# Patient Record
(demographics unavailable — no encounter records)

---

## 2024-10-16 NOTE — PROCEDURE
[FreeTextEntry1] : 10/2024 normal spirometry, stable previous data reviewed: 4/2024- normal spirometry   9/2023- normal spirometry, mild reduced RV, redued dlco that does not correct- stable compared to 2/2023  normal flow rates, mild decline from 2022 normal TLC reduced dlco- moderate that corrects to [facility]  [facility] 2021- resolved PETTY PNA

## 2024-10-16 NOTE — HISTORY OF PRESENT ILLNESS
[Never] : never [TextBox_4] : JONAS HINKLE is a 31 year old female who presents for f/u pfts  and pulm f/u she has lupus on plaquenil, cellcept & off prednisone  on weight loss meds  here for routine visit  off any bronchodilators    Smoking Status: never

## 2024-11-18 NOTE — HISTORY OF PRESENT ILLNESS
[FreeTextEntry1] : pt presents for follow up tvs with anterior wall new fibroid vs adenomyosis and right ovarian endometrioma decreased in size since last tvs on ocp denies pain or dub

## 2025-01-30 NOTE — HISTORY OF PRESENT ILLNESS
[FreeTextEntry1] : 2019.  Patient is a 27-year-old heavy woman who 10 years ago had symptoms consistent with pericarditis and was diagnosed with lupus at [facility].  At that time she did have fluid around her heart and she has been on CellCept, Plaquenil, and omeprazole since then.  Last  she had severe onset of chest pain and shortness of breath.  For a day or 2 took aspirin and Naprosyn and then on Wednesday finally went to the hospital.  There she had a troponin that was elevated at 83, isha to 100 and came down to 84, and a d-dimer of 623, sed rate of 84 and CRP of 12.62.  She claims her sed rate and CRP are usually normal.  She had a negative chest x-ray.  No ECG changes.  CT angiogram showed a small left pleural effusion but no pericardial effusion.  There was no central pulmonary emboli but they emphasize it was a limited study there was a suspicion for pulmonary emboli a VQ scan should be done.  Echocardiogram was done but was poor quality.  There was grossly normal LV function in the RV and RA were not well seen.  No pericardial effusion and normal pericardium.  She was placed on prednisone 60 mg and colchicine initially 1.2 mg and then 0.6 mg twice daily.  She was seen by cardiology who felt they could stop the colchicine.  She was not seen by rheumatology and she was discharged on  with follow-up here.  She shows up now and is still been having chest pain at rest and still having shortness of breath with some pleuritic component.  No long car trips or airplane rides etc.  No pain or swelling in her calves or thighs.She did have a negative venous Doppler earlier in the year when she had mild swelling.  She was briefly on furosemide but when the swelling went away the furosemide was stopped. Here there was no rub on exam no ECG changes and I elected to have her go for a VQ scan to fully rule out pulmonary emboli before assuming this was myopericarditis. 2019.  After low probability VQ scan with small fixed defect, radiology called me back today that the small infiltrate on the left lung in that area it looks bigger than it did in the hospital raising suspicion of possible pulmonary infarction. It is controversial if someone has just a small subsegmental luminary infarct that they need anticoagulation. However, based on that I am putting the patient back on Xarelto 20 mg for now and having her come back next week with repeat echo and visit to reevaluate the picture.  2019.  Patient returns in follow-up.  Had echocardiogram yesterday at [facility] and this time there is a new finding of a small to moderate lateral pericardial effusion, lateral to the LV.  No echocardiographic evidence of tamponade.  LVEF 60%.  RVSP 29.  EKG shows sinus rhythm at 76 and again is a normal tracing with no findings for acute pericarditis.  Feels better overall with no pleurisy or positional chest pain just a little point tenderness by the PMI. 2019.  Patient here in follow-up and for echocardiogram.  Was tapering off steroids and on Monday the  chest pain was so severe she went to the emergency room.  Work-up there was negative and she was bumped up to 40 mg of prednisone and told to keep her appointment here.  Here today her echocardiogram is within normal limits showing no pericardial effusion normal LV and RV function etc.  She is feeling better but still has some symptoms with inspiration.  Still has colchicine for 1 more week. 2019.  Patient was told at the last visit to come back in 6 weeks.  Here now.  Had labs  and  and .  Remains mildly anemic.  Cholesterol was 208 with an HDL of 71 and an LDL of 95 triglycerides 211.  Sed rate was 82 with an BEATRICE of 1:180 speckled pattern.  Positive ORACIO Salinas antibody and ORACIO RNP antibody.  Some degree of proteinuria.  Her EKG shows sinus rhythm at 85 and was a normal tracing.  She has gained over 20 pounds and has significant edema which I believe is new.  Her blood pressure had not been a problem before but she claims she went to the renal doctor and her blood pressure was high and he gave her amlodipine 10 mg.  Since then she has been retaining fluid.  She has also been on prednisone 10 mg daily.  She was given Lasix 80 mg for 1 week and now is on 40 mg but she does not think it has been much change.  Her next appointment with renal (Dr. Sima garcia [phone]) is at the end of December. May 20, 2021.  First visit in a year and a half.  She has a sharp chest pain upper sternum when she runs up the stairs that comes on after she stops and last for about 5 minutes.  It has been there for few weeks but has not gotten worse.  No chest pain walking inclines carrying packages etc. or waking her up from sleep.  Not really pleuritic but it is a little tender to touch.  Sent here for echo as well which was suboptimal but mostly within normal limits.  No pericardial effusion.  She had nifedipine added for blood pressure about 6 weeks ago.  She remains on enalapril, atorvastatin, Metformin along with her prednisone and her plaque in L and her CellCept.  Had a chest CT with pulmonary that had no pleural effusion or pericardial effusion and prior infiltrates just about gone.  No coronary calcification and normal heart size.  Her EKG is sinus rhythm at 79 and unremarkable.  2023.  First visit in over 2 years.  About 3 weeks ago she was at the hospital with her father who had a liver and kidney transplant and went into pulmonary edema and  in front of her.  She started having a lot of palpitations but they have not resolved even though she thinks she is not as stressed.  They are definitely less frequent but they come every few days last for a few minutes and seem more continuous than intermittent.  She does not get syncopal or near syncopal but she feels short of breath doing them.  She has been doing fairly well since I last saw her and is about to be put on medications by her endocrinologist for weight loss.  She is off the metformin and she is off any inhalers.  Has not had cardiac issues since the original episode.  Her EKG today is sinus rhythm at 98 and an unremarkable ECG as well as unchanged from previous.  Her echo back in  was pretty unremarkable. September 15, 2023. Reviewed results of the patient's Zio monitor and spoke with her. Patient triggered the monitor a couple of times but no severe symptoms. No arrhythmias noted common and occasional VPC and nothing that correlated with any of her symptoms. Most likely it is all just stress related and the patient was reassured.  May 22, 2024.Patient here for first time in about 9 months or so.  Her EKG is sinus rhythm at 89 and unremarkable.  Blood pressure okay and weight stable.  She has been very stable on her current regimen without any recent hospitalizations, emergency room visits, cardiac complaints.  Recent visit her blood test numbers for her lupus were much higher so they told her to get a cardiac evaluation.  She denies chest pain pleurisy shortness of breath. 2025.  Patient returns in follow-up.  Her lupus has flared up and her sed rate is up to 120 and she is having some chest pain so they wanted her heart checked out.  EKG is sinus rhythm 100 with some nonspecific changes, but unchanged from prior EKG blood pressure.  She says the chest pain comes at rest can last up to an hour, not affected by inspiration or position or exertion.  They have increased her meds a little less than a week ago but she has not seen a change yet in the symptoms.

## 2025-01-30 NOTE — REASON FOR VISIT
[FreeTextEntry1] : 32 year-old woman with lupus, with the prior diagnosis of lupus myopericarditis.

## 2025-01-30 NOTE — PHYSICAL EXAM
[Normal Appearance] : normal appearance [General Appearance - Well Developed] : well developed [Well Groomed] : well groomed [General Appearance - Well Nourished] : well nourished [No Deformities] : no deformities [General Appearance - In No Acute Distress] : no acute distress [Normal Conjunctiva] : the conjunctiva exhibited no abnormalities [Eyelids - No Xanthelasma] : the eyelids demonstrated no xanthelasmas [Normal Oral Mucosa] : normal oral mucosa [No Oral Pallor] : no oral pallor [No Oral Cyanosis] : no oral cyanosis [Normal Jugular Venous A Waves Present] : normal jugular venous A waves present [Normal Jugular Venous V Waves Present] : normal jugular venous V waves present [No Jugular Venous Fuchs A Waves] : no jugular venous fuchs A waves [Respiration, Rhythm And Depth] : normal respiratory rhythm and effort [Exaggerated Use Of Accessory Muscles For Inspiration] : no accessory muscle use [Heart Rate And Rhythm] : heart rate and rhythm were normal [Heart Sounds] : normal S1 and S2 [Murmurs] : no murmurs present [Abdomen Soft] : soft [Abdomen Tenderness] : non-tender [Abdomen Mass (___ Cm)] : no abdominal mass palpated [Nail Clubbing] : no clubbing of the fingernails [Cyanosis, Localized] : no localized cyanosis [Petechial Hemorrhages (___cm)] : no petechial hemorrhages [Skin Color & Pigmentation] : normal skin color and pigmentation [] : no rash [No Venous Stasis] : no venous stasis [Skin Lesions] : no skin lesions [No Skin Ulcers] : no skin ulcer [No Xanthoma] : no  xanthoma was observed [Oriented To Time, Place, And Person] : oriented to person, place, and time [Affect] : the affect was normal [Mood] : the mood was normal [No Anxiety] : not feeling anxious [FreeTextEntry1] : Calm not anxious

## 2025-01-30 NOTE — DISCUSSION/SUMMARY
[FreeTextEntry1] : Labs sent clued and proBNP.  If abnormal we will bring her back for an echo; otherwise reassure her that chest pain is likely noncardiac in this case. [EKG obtained to assist in diagnosis and management of assessed problem(s)] : EKG obtained to assist in diagnosis and management of assessed problem(s)

## 2025-01-30 NOTE — ASSESSMENT
[FreeTextEntry1] : Patient with previous episode of lupus pericarditis with pericardial effusion etc. All resolved. Has been stable from a cardiac point of view and I have not seen her for a year and a half.. Patient returned 2021 with upper sternal chest pain that comes on after she runs up the stairs and last for about 5 minutes. No other exertional symptoms or symptoms at rest. She has lost 45 pounds since I last saw her. Her cardiac exam and EKG are within normal limits. Her echo was slightly suboptimal with difficulty seeing endocardium but overall normal with no pericardial effusion and grossly normal systolic function, no significant valve disease. Blood pressure seems under good control. She has been recently vaccinated for COVID-19. I reassured her that I do not think this is a recurrence of her pericarditis but if the symptoms increase and persist to call me back. Labs will be checked including sed rate CRP lipids hemoglobin A1c. She returned August 16, 2023 with palpitations for about 3 weeks after the extremely stressful event of seeing her father passed away in front of her at the hospital. Her exam and her EKG were unremarkable. Her symptoms seem to be lessening but they are persisting 3 weeks later. I explained to the patient that most likely this is still related to the stress she has been and is under and that most likely we are dealing with extra beats. Given the unchanged exam and ECG I do not believe she needs an echocardiogram at this time. We agreed to hook up a ZIO monitor for 2 weeks to catch some of these episodes and rule out any significant or dangerous arrhythmia. If there are frequent VPCs or complex ectopy then obviously we will do an echo as well. For now labs were sent which will include thyroid function and a proBNP. I told her that once she has a few of these episodes she can take off the monitor and mail it back and does not need to wait the full 2 weeks.  Reviewed results of the patient's Zio monitor and spoke with her. Patient triggered the monitor a couple of times but no severe symptoms. No arrhythmias noted common and occasional VPC and nothing that correlated with any of her symptoms. Most likely it is all just stress related and the patient was reassured. She returned, May 22, 2024. Doing well with no complaints and no interval issues other than that her titers for her lupus blood test went up. Based on her exam and her ECG and her symptoms there does not seem to be any evidence for cardiac involvement. Labs were sent and will be reviewed.  Patient returns again January 30, 2025 because her lupus has acted up and she is having chest pain.  It is not pleuritic, and by exam and EKG there are no signs of pericarditis.  Her EKG does not have low voltage that has changed to suggest an effusion.  Labs will be checked.  I reassured her that at this point in time there does not seem to be cardiac involvement and her chest pain is more likely musculoskeletal and sternal.  If her proBNP has changed I would be inclined to bring her back for an echo

## 2025-01-30 NOTE — REVIEW OF SYSTEMS
[Negative] : Heme/Lymph [Weight Gain (___ Lbs)] : no recent weight gain [Feeling Fatigued] : not feeling fatigued [Weight Loss (___ Lbs)] : [unfilled] ~Ulb weight loss [SOB] : no shortness of breath [Dyspnea on exertion] : not dyspnea during exertion [Chest Discomfort] : chest discomfort [Lower Ext Edema] : no extremity edema [Palpitations] : no palpitations [Orthopnea] : no orthopnea [PND] : no PND [Syncope] : no syncope [Under Stress] : not under stress [FreeTextEntry5] : See HPI [de-identified] : Relaxed and calm today

## 2025-04-19 NOTE — PHYSICAL EXAM
[No Acute Distress] : no acute distress [No Respiratory Distress] : no respiratory distress  [No Accessory Muscle Use] : no accessory muscle use [Clear to Auscultation] : lungs were clear to auscultation bilaterally [Normal Rate] : normal rate  [Regular Rhythm] : with a regular rhythm [Normal S1, S2] : normal S1 and S2 [No Murmur] : no murmur heard [Speech Grossly Normal] : speech grossly normal [Alert and Oriented x3] : oriented to person, place, and time

## 2025-04-19 NOTE — HISTORY OF PRESENT ILLNESS
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 She tested positive for covid 3/20/25 She then developed chest pain and went to the ED.  CTA chest neg for PE however noted L 1.4cm thyroid nodule (which she follows with endocrine for every 6 months). CT A/P with new soft issue density at pancreatic tail/splenic hilum and hypoattenuation of spleen suggestive of infarct. EKG with TWI, cardiology assessed patient and determined low likelihood of ACS. Started on heparin GTT and  started on lidocaine patch for left shoulder pain and morphine/oxycodone for left abdominal pain. She had worsening proteinuria and nephro consulted. Renal biopsy was rescheduled when patient developed fever and tachycardia. Started on zosyn. MRI abdomen showed developing/resolving pancreatitis. Treated with IVF and pain control. Developed elevated LFTs and subsequent labs significant for EBV and CMV positive. Doppler abdomen US showed no signs of hepatic clot.  ID consulted for possible EBV reactivation per hepatology and recommended no treatment. LFTs downtrended after stopping Zosyn.  Renal biopsy performed on 4/3/25. Eliquis started for splenic infarct on 4/6/25  Of note, ozempic, OCP, omeprazole and mycophenadate was stopped during hospital stay Post discharge, patient feeling well overall. She reports resolution of chest pain and abdominal pain.  She has resumed her regular diet.

## 2025-04-19 NOTE — PLAN
[FreeTextEntry1] : Hospital discharge follow up - Hyponatremia - repeat labs today - Covid, pancreatitis. Symptoms have resolved. - Elevated liver enzymes- likely 2/2 EBV. repeat labs today  Incidental findings on imaging from hospital stay: - right sided endometrioma measuring 2.6 cm. Interval decrease in size of complex left adnexal complex cyst. ? GYN follow up - Adrenals: Stable 2.0 cm left adrenal myelolipoma Splenic infarct - continue Eliquis 5 mg BID - appointment with Rheum Dr Eugene Cook May 5, 2025 - appointment with nephro Dr Александр Smith May 3, 2025. follow-up renal biopsy results and lupus nephritis management - appointment with endocrine Dr Nereyda Skaggs  Diabetes mellitus - Ozempic stopped in hospital due to pancreatitis. - Endocrine follow up  HLD - Continue atorvastatin 40 mg QD - LDL 75 March 2025 in hospital stay  HTN - blood pressure at goal - Continue enalapril 20 mg QD, nifedipine XL 60 mg QD  Lupus nephritis - Continue plaquenil 200 mg BID - sodium bicarbonate 650 mg 3 tabs BID  Thyroid nodule - endocrine follow up.

## 2025-04-19 NOTE — HISTORY OF PRESENT ILLNESS
[FreeTextEntry1] : follow up [de-identified] : 33 F pmhx lupus nephritis on mycophenolate, hypertension, diabetes presents for follow up Had appointment last week for hospital discharge follow up.  Blood pressure has since normalized.  Patient feeling well. Will be returning to work tomorrow.  Has appointment with rheum today

## 2025-04-19 NOTE — PLAN
[FreeTextEntry1] : Hospital discharge follow up -	Hyponatremia - will repeat labs at appointment. Nephro follow up -	Covid, pancreatitis. Symptoms have resolved. -	Elevated liver enzymes- likely 2/2 EBV. Will be making appointment with hepatology  -  patient requesting letter to return to work  Incidental findings on imaging from hospital stay: -	right sided endometrioma measuring 2.6 cm. Interval decrease in size of complex left adnexal complex cyst. ? GYN follow up -	Adrenals: Stable 2.0 cm left adrenal myelolipoma -	 Splenic infarct  - continue Eliquis 5 mg BID - appointment with Rheum Dr Eugene Cook May 5, 2025 - appointment with nephro Dr Александр Smith May 3, 2025. follow-up renal biopsy results and lupus nephritis management - appointment with endocrine Dr Nereyda Skaggs today  Diabetes mellitus  -	Ozempic stopped in hospital due to pancreatitis.  -	Endocrine follow up  HLD -	Continue atorvastatin 40 mg QD  -	LDL 75 March 2025 in hospital stay  HTN -	Continue enalapril 20 mg QD, nifedipine XL 60 mg QD  -	Patient reports fluctuating blood pressure since discharge -	Will follow up in office next week for BP check  Lupus nephritis -	Continue plaquenil 200 mg BID  -	sodium bicarbonate 650 mg 3 tabs BID   Thyroid nodule -	endocrine follow up

## 2025-04-19 NOTE — HISTORY OF PRESENT ILLNESS
[FreeTextEntry1] : follow up [de-identified] : 33 F pmhx lupus nephritis on mycophenolate, hypertension, diabetes presents for follow up Had appointment last week for hospital discharge follow up.  Blood pressure has since normalized.  Patient feeling well. Will be returning to work tomorrow.  Has appointment with rheum today

## 2025-04-19 NOTE — HISTORY OF PRESENT ILLNESS
[FreeTextEntry2] : 33 F pmhx lupus nephritis on mycophenolate, hypertension, diabetes presents for post hospital discharge follow up She was admitted to [facility] March 25- April 6, 2025 for splenic infarct. She tested positive for covid 3/20/25 She then developed chest pain and went to the ED.  CTA chest neg for PE however noted L 1.4cm thyroid nodule (which she follows with endocrine for every 6 months). CT A/P with new soft issue density at pancreatic tail/splenic hilum and hypoattenuation of spleen suggestive of infarct. EKG with TWI, cardiology assessed patient and determined low likelihood of ACS. Started on heparin GTT and  started on lidocaine patch for left shoulder pain and morphine/oxycodone for left abdominal pain. She had worsening proteinuria and nephro consulted. Renal biopsy was rescheduled when patient developed fever and tachycardia. Started on zosyn. MRI abdomen showed developing/resolving pancreatitis. Treated with IVF and pain control. Developed elevated LFTs and subsequent labs significant for EBV and CMV positive. Doppler abdomen US showed no signs of hepatic clot.  ID consulted for possible EBV reactivation per hepatology and recommended no treatment. LFTs downtrended after stopping Zosyn.  Renal biopsy performed on 4/3/25. Eliquis started for splenic infarct on 4/6/25  Of note, ozempic, OCP, omeprazole and mycophenadate was stopped during hospital stay Post discharge, patient feeling well overall. She reports resolution of chest pain and abdominal pain.  She has resumed her regular diet.

## 2025-04-20 NOTE — PROCEDURE
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  These findings are diagnostic for immune complex mediated nephritis with membranous pattern.  Membranous GN can be idiopathic or secondary.  In this clinical setting, the patient had the biopsy-proven membranous lupus nephritis in 2020, and receiving immunosuppressive medication for SLE, morphologic features are suggestive of membranous lupus nephritis, ISN/RPS class V.  There is no activity and mild to moderate chronicity.  In addition, there are frequent intratubular calcium phosphate crystals, consistent with a hypercalciuric condition such as hyperparathyroidism, calciuric diuretics, granulomatous disease, immobilization, bone disease, vitamin D intoxication, or milk alkali syndrome. In rare pathogenesis, inherited tubulopathy such as Bartter syndrome, hypomagnesemic hypercalciuric nephrocalcinosis, and autosomal dominant hypocalcemia.  Clinical correlation is advised.  Dr. Andrés Raman was notified on April 7, 2025 at 3:39 PM and Dr. Александр Smith on April 14, 2025 at 11:49 PM.  10/2024 normal spirometry, stable previous data reviewed: 4/2024- normal spirometry   9/2023- normal spirometry, mild reduced RV, redued dlco that does not correct- stable compared to 2/2023  normal flow rates, mild decline from 2022 normal TLC reduced dlco- moderate that [facility]s to [facility] 2021- resolved PETTY PNA

## 2025-04-20 NOTE — PROCEDURE
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  These findings are diagnostic for immune complex mediated nephritis with membranous pattern.  Membranous GN can be idiopathic or secondary.  In this clinical setting, the patient had the biopsy-proven membranous lupus nephritis in 2020, and receiving immunosuppressive medication for SLE, morphologic features are suggestive of membranous lupus nephritis, ISN/RPS class V.  There is no activity and mild to moderate chronicity.  In addition, there are frequent intratubular calcium phosphate crystals, consistent with a hypercalciuric condition such as hyperparathyroidism, calciuric diuretics, granulomatous disease, immobilization, bone disease, vitamin D intoxication, or milk alkali syndrome. In rare pathogenesis, inherited tubulopathy such as Bartter syndrome, hypomagnesemic hypercalciuric nephrocalcinosis, and autosomal dominant hypocalcemia.  Clinical correlation is advised.  Dr. Andrés Raman was notified on April 7, 2025 at 3:39 PM and Dr. Александр Smith on April 14, 2025 at 11:49 PM.  10/2024 normal spirometry, stable previous data reviewed: 4/2024- normal spirometry   9/2023- normal spirometry, mild reduced RV, redued dlco that does not correct- stable compared to 2/2023  normal flow rates, mild decline from 2022 normal TLC reduced dlco- moderate that [facility]s to [facility]  ct chest [facility] 2021- resolved PETTY PNA

## 2025-04-20 NOTE — HISTORY OF PRESENT ILLNESS
[Never] : never [TextBox_4] : JONAS HINKLE is a 33 year old female who presents for f/u  on her cough last seen 10/2024   PMH lupus nephritis on hydroxychloroquine and MMF, hypertension, diabetes, GERD   since last  visit - COVID infection on 3/20 ( started paxlovid) and on OCPS  was in ER recently for cough/ dyspnea, found to have  splenic infarct and pancreatic lesion (possible pancreatitis)  Course further complicated with fevers  placed on IVZosyn. MR abdomen 03/28 with developing vs resolving pancreatitis, likely related to splenic infarct , s/p renal bx-    renal biopsy- lupus nephritis on NOAC for her splenic infarct off prednisone

## 2025-05-02 NOTE — REASON FOR VISIT
[Post Hospitalization] : a post hospitalization visit [FreeTextEntry1] : Pancreatitis, Splenic Infarct

## 2025-05-02 NOTE — ASSESSMENT
[FreeTextEntry1] : Referred pt for CT Abdomen and Pelvis, will f/u with results. Pt expressed understanding and agrees with the plan.  Pancreatitis is inflammation of the pancreas. Inflammation is immune system activity that can cause swelling, pain, and changes in how an organ or tissues work.    The pancreas is a long, flat gland that's tucked behind the stomach. The pancreas helps the body digest food and regulates blood sugars.    Pancreatitis can be an acute condition. This means it appears suddenly and generally lasts a short time. Chronic pancreatitis is a long-term condition. The damage to the pancreas can get worse over time.    Acute pancreatitis may improve on its own. More-serious disease requires treatment in a hospital and can cause life-threatening complications. Symptoms Symptoms of pancreatitis may vary. Acute pancreatitis symptoms may include:   Pain in the upper belly. Pain in the upper belly that radiates to the back. Tenderness when touching the belly. Fever. Rapid pulse. Upset stomach. Vomiting. Chronic pancreatitis signs and symptoms include:   Pain in the upper belly. Belly pain that feels worse after eating. Losing weight without trying. Oily, smelly stools. Some people with chronic pancreatitis only develop symptoms after they get complications of the disease.   When to see a doctor Make an appointment with your doctor if you have sudden belly pain or belly pain that doesn't improve. Seek immediate medical help if your pain is so severe that you can't sit still or find a position that makes you more comfortable.  Causes The pancreas has two major roles. It produces insulin, which helps the body manage and use sugars.   The pancreas also produces dietary juices, called enzymes, that help with digestion. The pancreas makes and stores "turned off" versions of the enzymes. After the pancreas sends the enzymes into the small intestine, they are "turned on" and break down proteins in the small intestine.   If the enzymes are turned on too soon, they can start acting like digestive juices inside the pancreas. The action can irritate, damage or destroy cells. This problem, in turn, leads to immune system responses that cause swelling and other events that affect how the pancreas works.   Several conditions can lead to acute pancreatitis, including:   Blockage in the bile duct caused by gallstones. Heavy alcohol use. Certain medicines. High triglyceride levels in the blood. High calcium levels in the blood. Pancreas cancer. Injuries from trauma or surgery. Conditions that can lead to chronic pancreatitis include:   Damage from repeated acute pancreatitis. Heavy alcohol use. Inherited genes linked to pancreatitis. High triglyceride levels in the blood. High calcium levels in the blood. Sometimes, a cause for pancreatitis is never found. This is known as idiopathic pancreatitis.   Risk factors Factors that increase your risk of pancreatitis include:   Excessive alcohol use. Research shows that having four or five drinks a day increases the risk of pancreatitis. Cigarette smoking. Compared with nonsmokers, smokers are on average three times more likely to develop chronic pancreatitis. Quitting smoking can decrease the risk. Obesity. People with a body mass index of 30 or higher are at increased risk of pancreatitis. Diabetes. Having diabetes increases the risk of pancreatitis. Family history of pancreatitis. A number of genes have been linked to chronic pancreatitis. A family history of the disease is linked to an increased risk, especially when combined with other risk factors. Complications Pancreatitis can cause serious complications, including:   Kidney failure. Acute pancreatitis may result in the kidneys not filtering waste from the blood. Artificial filtering, called dialysis, may be needed for short-term or long-term treatment. Breathing problems. Acute pancreatitis can cause changes in how the lungs work, causing the level of oxygen in the blood to fall to dangerously low levels. Infection. Acute pancreatitis can make the pancreas vulnerable to infections. Pancreatic infections are serious and require intensive treatment, such as surgery or other procedures to remove the infected tissue. Pseudocyst. Acute and chronic pancreatitis can cause fluid and debris to collect in a "pocket" in the pancreas, called a pseudocyst. A large pseudocyst that ruptures can cause complications such as internal bleeding and infection. Malnutrition. With both acute and chronic pancreatitis, the pancreas may not produce enough enzymes for the digestive system. This can lead to malnutrition, diarrhea and weight loss. Diabetes. Diabetes can develop when chronic pancreatitis damages cells that produce insulin. Pancreatic cancer. Long-standing inflammation in the pancreas is a risk factor for cancer of the pancreas.  A low acid/reflux diet was discussed in great detail including not smoking, not drinking alcohol, and not consuming foods that irritate the esophagus. It is helpful to eat small meals throughout the day instead of large meals. You should avoid eating before bedtime or lying down after you eat. It can be helpful to raise the head of your bed six inches. Additionally, you should maintain a healthy weight and good posture. The patient was given written material to take home and review.  I spent 45 minutes with the patient as well as reviewing documents prior to and after the office visit. Patient verbalized understanding of all information provided. All questions answered and reviewed.  Robert Brunner, MD

## 2025-05-02 NOTE — HISTORY OF PRESENT ILLNESS
[FreeTextEntry1] : Patient is a 32 y/o female with a PMHx of Anemia, Anxiety, Depression, hypertension, Diabetes Mellitus, Endometrioma, Fibroids, hyperlipidemia, Vitamin D Insufficiency, SLE with Lupus Nephritis (currently on mycophenolate), Morbid Obesity, PCOS, Pericarditis, Splenic infarct, Thalassemia-hemoglobin E disease, thyroid nodule, Appendicitis s/p appendectomy, Cholelithiasis s/p cholecystectomy, GERD, and past C. Diff Infection, who presents for a hospital f/u s/p being hospitalized at [facility] from 3/25/2025 to 4/6/2025 with acute pancreatitis and a splenic infarct. Pt was on Ozempic prior to her hospitalization, and she was instructed to stop when the Pancreatitis was discovered, and pt was treated with heparin for splenic infarct. Pt is now asymptomatic and feeling well, denies abdominal pain. Pt has never had an EGD or screening Colonoscopy.

## 2025-05-02 NOTE — PHYSICAL EXAM
[Alert] : alert [Normal Voice/Communication] : normal voice/communication [Healthy Appearing] : healthy appearing [No Acute Distress] : no acute distress [Sclera] : the sclera and conjunctiva were normal [Hearing Threshold Finger Rub Not [place]] : hearing was normal [Normal Lips/Gums] : the lips and gums were normal [Oropharynx] : the oropharynx was normal [Normal Appearance] : the appearance of the neck was normal [No Neck Mass] : no neck mass was observed [No Respiratory Distress] : no respiratory distress [No Acc Muscle Use] : no accessory muscle use [Respiration, Rhythm And Depth] : normal respiratory rhythm and effort [Auscultation Breath Sounds / Voice Sounds] : lungs were clear to auscultation bilaterally [Heart Rate And Rhythm] : heart rate was normal and rhythm regular [Normal S1, S2] : normal S1 and S2 [Murmurs] : no murmurs [Bowel Sounds] : normal bowel sounds [Abdomen Tenderness] : non-tender [No Masses] : no abdominal mass palpated [Abdomen Soft] : soft [] : no hepatosplenomegaly [Oriented To Time, Place, And Person] : oriented to person, place, and time

## 2025-05-02 NOTE — HISTORY OF PRESENT ILLNESS
[FreeTextEntry1] : Patient is a 32 y/o female with a PMHx of Anemia, Anxiety, Depression, hypertension, Diabetes Mellitus, Endometrioma, Fibroids, hyperlipidemia, Vitamin D Insufficiency, SLE with Lupus Nephritis (currently on mycophenolate), Morbid Obesity, PCOS, Pericarditis, Splenic infarct, Thalassemia-hemoglobin E disease, thyroid nodule, Appendicitis s/p appendectomy, Cholelithiasis s/p cholecystectomy, GERD, and past C. Diff Infection, who presents for a hospital f/u s/p being hospitalized at [facility] from 3/25/2025 to 4/6/2025 with acute pancreatitis and a splenic infarct. Pt was on Ozempic prior to her hospitalization, and she was instructed to stop when the Pancreatitis was discovered, and pt was treated with heparin for splenic infarct. Pt is now asymptomatic and feeling well, denies abdominal pain. Pt has never had an EGD or screening Colonoscopy.

## 2025-05-19 NOTE — ASSESSMENT
[FreeTextEntry1] : This is a 33 year old female with SLE, lupus nephritis which was found on  kidney biopsy 02/2020 at [facility].  Patient prestos for the evaluation  psoriasis, and hx of anemia presenting for evaluation of worsening anemia and associated fatigue.   was previously at our office years ago for anemia, Hg has been stable in the 9-11g/dl range. 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   Patient with an otherwise unexplained  splenic infarct. Will run hypercoagulable state profile.   Check Activated Protein C resistance/FVL, Prothrombin gene mutation, Homocysteine, DRVVT, Antiphospholipid antibodies, Protein C and S, AT III activity.

## 2025-05-19 NOTE — HISTORY OF PRESENT ILLNESS
[de-identified] :  This is a 33 year old woman with a history of SLE, on Plaquenil. She presents fo the evaluation of splenic infarcts.   March 14th had chest pains and left sided rib pains. CTA did not show PE, but patient had a splenic infarct. Patient was on birth control.   Was on a flight from [place] and back 2 weeks prior.   Hx SLE, on collect and Plaquenil, the antibodies have not been positive lately, but prior to the event was really high.  ESR has been high.   Patient did have an echocardiogram at the hospital  Was ot the hospital for 10 days, was noticed to have a EBV reactivation while there + Viral load.  Patient was discharged on Eliquis after 10 days of heparin, pain went away after about 10 days.   Has not recurred in the 6 weeks until now in follow up.   Was ot the hosiptail for 10 days, wAsn oticed to have a EBV reactivation while there + Viral load.  Patient was discahrged on Eliqusi after 10 days of heparin, pain went away after about 10 days.   Has not recurred in the 6 weeks until now in follow up.

## 2025-05-19 NOTE — HISTORY OF PRESENT ILLNESS
[de-identified] :  This is a 33 year old woman with a history of SLE, on Plaquenil. She presents fo the evaluation of splenic infarcts.   March 14th had chest pains and left sided rib pains. CTA did not show PE, but patient had a splenic infarct. Patient was on birth control.   Was on a flight from [place] and back 2 weeks prior.   Hx SLE, on collect and Plaquenil, the antibodies have not been positive lately, but prior to the event was really high.  ESR has been high.   Patient did have an echocardiogram at the hospital  Was ot the hospital for 10 days, was noticed to have a EBV reactivation while there + Viral load.  Patient was discharged on Eliquis after 10 days of heparin, pain went away after about 10 days.   Has not recurred in the 6 weeks until now in follow up.   Was ot the hosiptail for 10 days, wAsn oticed to have a EBV reactivation while there + Viral load.  Patient was discahrged on Eliqusi after 10 days of heparin, pain went away after about 10 days.   Has not recurred in the 6 weeks until now in follow up.

## 2025-05-19 NOTE — ASSESSMENT
[FreeTextEntry1] : This is a 33 year old female with SLE, lupus nephritis which was found on  kidney biopsy 02/2020 at [facility].  Patient prestos for the evaluation  psoriasis, and hx of anemia presenting for evaluation of worsening anemia and associated fatigue.   was previously at our office years ago for anemia, Hg has been stable in the 9-11g/dl range. 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   Patient with an otherwise unexplained  splenic infarct. Will run hypercoagulable state profile.   Check Activated Protein C resistance/FVL, Prothrombin gene mutation, Homocysteine, DRVVT, Antiphospholipid antibodies, Protein C and S, AT III activity.

## 2025-05-27 NOTE — ASSESSMENT
[FreeTextEntry1] : No sleep disordered breathing identified Baseline pulmonary function testing stable from prior No role for bronchodilators Eliquis as per heme-onc

## 2025-05-27 NOTE — HISTORY OF PRESENT ILLNESS
[Never] : never [TextBox_4] : JONAS HINKLE is a 33 year old female who presents for f/u  on pfts and HST results PMH :   lupus nephritis on hydroxychloroquine and MMF, hypertension, diabetes, GERD   since last  visit - COVID infection on 3/20/25 ( started paxlovid) and on OCPS  was in ER recently for cough/ dyspnea, found to have  splenic infarct and pancreatic lesion (possible pancreatitis)  Course further complicated with fevers  placed on IVZosyn. MR abdomen 03/28 with developing vs resolving pancreatitis, likely related to splenic infarct , s/p renal bx-   renal biopsy- lupus nephritis  on NOAC for her splenic infarct- fiollows with heme onco seen GI Fro fu/ no pancreatitis  off prednisone

## 2025-05-27 NOTE — PROCEDURE
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  These findings are diagnostic for immune complex mediated nephritis with membranous pattern.  Membranous GN can be idiopathic or secondary.  In this clinical setting, the patient had the biopsy-proven membranous lupus nephritis in 2020, and receiving immunosuppressive medication for SLE, morphologic features are suggestive of membranous lupus nephritis, ISN/RPS class V.  There is no activity and mild to moderate chronicity.  In addition, there are frequent intratubular calcium phosphate crystals, consistent with a hypercalciuric condition such as hyperparathyroidism, calciuric diuretics, granulomatous disease, immobilization, bone disease, vitamin D intoxication, or milk alkali syndrome. In rare pathogenesis, inherited tubulopathy such as Bartter syndrome, hypomagnesemic hypercalciuric nephrocalcinosis, and autosomal dominant hypocalcemia.  Clinical correlation is advised.  Dr. Andrés Raman was notified on April 7, 2025 at 3:39 PM and Dr. Александр Smith on April 14, 2025 at 11:49 PM.  10/2024 normal spirometry, stable previous data reviewed: 4/2024- normal spirometry   9/2023- normal spirometry, mild reduced RV, redued dlco that does not correct- stable compared to 2/2023  normal flow rates, mild decline from 2022 normal TLC reduced dlco- moderate that corrects to [facility]  ct chest [facility] 2021- resolved PETTY PNA

## 2025-06-27 NOTE — ASSESSMENT
[FreeTextEntry1] : As per Dr Brunner recommendations patient will be referred to Dr Vogel for further evaluation of new splenic infarct.   Patient advised if s/s worsen or become severe to seek emergency treatment.  Patient verbalized understanding of all information provided. All questions answered and reviewed.  I spent 35 minutes with the patient as well as reviewing documents prior to and after the office visit

## 2025-06-27 NOTE — PHYSICAL EXAM
[Alert] : alert [Normal Voice/Communication] : normal voice/communication [Healthy Appearing] : healthy appearing [No Acute Distress] : no acute distress [Sclera] : the sclera and conjunctiva were normal [Normal Lips/Gums] : the lips and gums were normal [Hearing Threshold Finger Rub Not [place]] : hearing was normal [Oropharynx] : the oropharynx was normal [Normal Appearance] : the appearance of the neck was normal [No Neck Mass] : no neck mass was observed [No Respiratory Distress] : no respiratory distress [No Acc Muscle Use] : no accessory muscle use [Respiration, Rhythm And Depth] : normal respiratory rhythm and effort [Auscultation Breath Sounds / Voice Sounds] : lungs were clear to auscultation bilaterally [Heart Rate And Rhythm] : heart rate was normal and rhythm regular [Normal S1, S2] : normal S1 and S2 [Murmurs] : no murmurs [Bowel Sounds] : normal bowel sounds [No Masses] : no abdominal mass palpated [Abdomen Soft] : soft [] : no hepatosplenomegaly [LUQ] : LUQ [Oriented To Time, Place, And Person] : oriented to person, place, and time

## 2025-06-27 NOTE — HISTORY OF PRESENT ILLNESS
[FreeTextEntry1] : 33F h/o SLE, lupus nephritis, splenic infarct in March 2025 presented to ED 6/24/25 with L sided abdominal pain. States the pain began in left shoulder yesterday but today was most notable in LUQ with ongoing radiation to left shoulder. Denies falls/trauma. States feels similar to when the pain of her splenic infarct began in March. No clear association with food/drink. No n/v/d though has not had appetite throughout today. No exertional component. Has been on Eliquis since the admission for her infarct for which she is compliant.   ACC: [id number]     EXAM:  CT ABDOMEN AND PELVIS IC   ORDERED BY: KAILEY LE  PROCEDURE DATE:  06/24/2025    INTERPRETATION:  CLINICAL INFORMATION: Left upper quadrant pain  COMPARISON: CT abdomen 5/10/2025, CT abdomen pelvis 3/25/2025, pelvic ultrasound 7/12/2023.  CONTRAST/COMPLICATIONS: IV Contrast: Omnipaque 350  95 cc administered   5 cc discarded Oral Contrast: NONE.  PROCEDURE: CT of the Abdomen and Pelvis was performed. Sagittal and coronal reformats were performed.  FINDINGS: LOWER CHEST: Within normal limits.  LIVER: Within normal limits. BILE DUCTS: Normal caliber. GALLBLADDER: Cholecystectomy. SPLEEN: Multiple wedge-shaped regions of hypoattenuation in the spleen. A region at the inferior pole of the spleen was previously seen on prior exam from 5/10/2025 and appear slightly decreased in size.. A new focus of hypoattenuation however is seen posteriorly, compatible with splenic infarct ([id number]). PANCREAS: Within normal limits. ADRENALS: Within normal limits. KIDNEYS/URETERS: Within normal limits.  BLADDER: Within normal limits. REPRODUCTIVE ORGANS: Normal uterus. Bilateral adnexal hypodense lesions measuring slightly greater than simple fluid density and previously characterized as endometriomas, measuring 5.3 x 4.6 cm on the right and 3.9 x 2.6 cm on the left, both of which are increased in size since prior exam from 3/25/2025.  BOWEL: No bowel obstruction. Appendix is not visualized. No evidence of inflammation in the pericecal region. Distal transverse and descending colon are underdistended which sends evaluation of the wall thickness. No inflammatory changes. PERITONEUM/RETROPERITONEUM: Within normal limits. VESSELS: Within normal limits. LYMPH NODES: No lymphadenopathy. ABDOMINAL WALL: Within normal limits. BONES: Within normal limits.  IMPRESSION: New splenic infarct  --- End of Report ---

## 2025-06-27 NOTE — PHYSICAL EXAM
[Alert] : alert [Normal Voice/Communication] : normal voice/communication [Healthy Appearing] : healthy appearing [No Acute Distress] : no acute distress [Sclera] : the sclera and conjunctiva were normal [Normal Lips/Gums] : the lips and gums were normal [Hearing Threshold Finger Rub Not [place]] : hearing was normal [Oropharynx] : the oropharynx was normal [Normal Appearance] : the appearance of the neck was normal [No Neck Mass] : no neck mass was observed [No Respiratory Distress] : no respiratory distress [No Acc Muscle Use] : no accessory muscle use [Respiration, Rhythm And Depth] : normal respiratory rhythm and effort [Auscultation Breath Sounds / Voice Sounds] : lungs were clear to auscultation bilaterally [Heart Rate And Rhythm] : heart rate was normal and rhythm regular [Normal S1, S2] : normal S1 and S2 [Murmurs] : no murmurs [Bowel Sounds] : normal bowel sounds [No Masses] : no abdominal mass palpated [Abdomen Soft] : soft [] : no hepatosplenomegaly [LUQ] : LUQ [Oriented To Time, Place, And Person] : oriented to person, place, and time

## 2025-07-02 NOTE — HISTORY OF PRESENT ILLNESS
[de-identified] : Patient had another CT scan in May where she was found to have yet another splenic infarct. Still has left shoulder pain following this.   Still having shortness of breath, still suffering left shoulder pain.  Some SOB especially when going up the stairs more quickly.   Patient hypercoagulable state was only positive for hand elevated homocysteine. Does not appear that the addition of folic acid 1mg made a change in the pattern of thrombosis. Patient has been on Eliquis 5mg BID since the initial infarct, the 2nd infarct happened while on Eliquis.    Patient had an echocardiogram, patient did not have any findings that would have explained the situation.   Patient was on the estrogen continuing oral contraceptives at the time of the first splenic infarct, this iWars discontinued with the splenic infarct, patient then proceeded to have another splenic infarct while off of estrogen containing OCP. Patient is about to start progestin only OCP for the endometriosis.   CT scan May 2025  INTERPRETATION:  CLINICAL INFORMATION: Left upper quadrant pain  COMPARISON: CT abdomen 5/10/2025, CT abdomen pelvis 3/25/2025, pelvic ultrasound 7/12/2023.  CONTRAST/COMPLICATIONS: IV Contrast: Omnipaque 350  95 cc administered   5 cc discarded Oral Contrast: NONE.  PROCEDURE: CT of the Abdomen and Pelvis was performed. Sagittal and coronal reformats were performed.  FINDINGS: LOWER CHEST: Within normal limits.  LIVER: Within normal limits. BILE DUCTS: Normal caliber. GALLBLADDER: Cholecystectomy. SPLEEN: Multiple wedge-shaped regions of hypoattenuation in the spleen. A region at the inferior pole of the spleen was previously seen on prior exam from 5/10/2025 and appear slightly decreased in size.. A new focus of hypoattenuation however is seen posteriorly, compatible with splenic infarct ([id number]). PANCREAS: Within normal limits. ADRENALS: Within normal limits. KIDNEYS/URETERS: Within normal limits.  BLADDER: Within normal limits. REPRODUCTIVE ORGANS: Normal uterus. Bilateral adnexal hypodense lesions measuring slightly greater than simple fluid density and previously characterized as endometriomas, measuring 5.3 x 4.6 cm on the right and 3.9 x 2.6 cm on the left, both of which are increased in size since prior exam from 3/25/2025.  BOWEL: No bowel obstruction. Appendix is not visualized. No evidence of inflammation in the pericecal region. Distal transverse and descending colon are underdistended which sends evaluation of the wall thickness. No inflammatory changes. PERITONEUM/RETROPERITONEUM: Within normal limits. VESSELS: Within normal limits. LYMPH NODES: No lymphadenopathy. ABDOMINAL WALL: Within normal limits. BONES: Within normal limits.  IMPRESSION: New splenic infarct

## 2025-07-02 NOTE — ASSESSMENT
[FreeTextEntry1] : This is a 33 year old female with SLE, lupus nephritis which was found on  kidney biopsy 02/2020 at [facility].  Patient prestos for the evaluation  psoriasis, and hx of anemia presenting for evaluation of worsening anemia and associated fatigue.   was previously at our office years ago for anemia, Hg has been stable in the 9-11g/dl range. 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   Patient with an otherwise unexplained  splenic infarct.  Patient was on an estrogen containing OCP at the time of the first infarct which could have explained the event, whoever patient had a 2nd more recent splenic infarct  may while off of the  estrogen containing OCP.  This 2nd infarct is largely unexplainable.    Hypercoagulable state evaluation was unremarkable except for an elevated homocysteine.  Patient on a  folic acid which only partially mitigates the effect. 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  Can start an 81mg aspirin.

## 2025-07-14 NOTE — PLAN
[FreeTextEntry1] : Health Care Maintenance - routine labs, follow up results -- bloodwork performed in office - STD screening today - Pap June 2025 Dr Reynolds - depression screen negative - recommend annual skin cancer screening with Dermatologist - recommended annual eye exam with Ophthalmologist - recommended annual dental exam - h/o  lupus nephritis on mycophenolate, hypertension, diabetes  - continue lifestyle modifications - CPE in 1 year or sooner visit as needed  Iron deficiency anemia - currently on iron supplements and folic acid. follow up heme Dr York   Splenic infarct - continue Eliquis 5 mg BID, asa 81 mg QD  - f/u heme Dr York  Diabetes mellitus - f/u a1c  - Ozempic stopped in hospital due to pancreatitis. - currently on Jardiance 10 mg QD  - Endocrine follow up  HLD - f/u lipid panel and CMP - Continue atorvastatin 40 mg QD  HTN - blood pressure at goal - Continue enalapril 20 mg QD, nifedipine XL 60 mg QD  Lupus nephritis - Continue plaquenil 200 mg BID - sodium bicarbonate 650 mg 3 tabs BID nephro Dr Александр Smith  Thyroid nodule - .endocrine Dr Nereyda Skaggs

## 2025-07-14 NOTE — HISTORY OF PRESENT ILLNESS
[FreeTextEntry1] : CPE [de-identified] : 33 F pmhx lupus nephritis on mycophenolate, hypertension, diabetes presents for CPE Recently diagnosed with 2nd splenic infarct. Patient was already on eliquis from initial infarct, which is resolving per scan. Seen by heme and now also on aspirin 81 mg QD. Feels well today and denies all complaints.  Now on lo loestrin by GYN

## 2025-07-14 NOTE — HEALTH RISK ASSESSMENT
[No] : In the past 12 months have you used drugs other than those required for medical reasons? No [0] : 2) Feeling down, depressed, or hopeless: Not at all (0) [PHQ-2 Negative - No further assessment needed] : PHQ-2 Negative - No further assessment needed [Never] : Never [NO] : No [HIV test declined] : HIV test declined [Hepatitis C test declined] : Hepatitis C test declined [With Family] : lives with family [Employed] : employed [Single] : single [Feels Safe at Home] : Feels safe at home [Fully functional (bathing, dressing, toileting, transferring, walking, feeding)] : Fully functional (bathing, dressing, toileting, transferring, walking, feeding) [Fully functional (using the telephone, shopping, preparing meals, housekeeping, doing laundry, using] : Fully functional and needs no help or supervision to perform IADLs (using the telephone, shopping, preparing meals, housekeeping, doing laundry, using transportation, managing medications and managing finances) [Smoke Detector] : smoke detector [Carbon Monoxide Detector] : carbon monoxide detector [Audit-CScore] : 0 [[id number]] : 0 [Change in mental status noted] : No change in mental status noted [Language] : denies difficulty with language [Handling Complex Tasks] : denies difficulty handling complex tasks [Reports changes in hearing] : Reports no changes in hearing [Reports changes in vision] : Reports no changes in vision [Reports changes in dental health] : Reports no changes in dental health [PapSmearDate] : 06/25 [de-identified] : with younger brother [FreeTextEntry2[facility]

## 2025-07-14 NOTE — PHYSICAL EXAM
[No Acute Distress] : no acute distress [Well-Appearing] : well-appearing [Normal Sclera/Conjunctiva] : normal sclera/conjunctiva [PERRL] : pupils equal round and reactive to light [EOMI] : extraocular movements intact [Normal TMs] : both tympanic membranes were normal [Supple] : supple [No Respiratory Distress] : no respiratory distress  [No Accessory Muscle Use] : no accessory muscle use [Clear to Auscultation] : lungs were clear to auscultation bilaterally [Normal Rate] : normal rate  [Regular Rhythm] : with a regular rhythm [Normal S1, S2] : normal S1 and S2 [No Edema] : there was no peripheral edema [Soft] : abdomen soft [Non Tender] : non-tender [Non-distended] : non-distended [Normal Bowel Sounds] : normal bowel sounds [Grossly Normal Strength/Tone] : grossly normal strength/tone [No Focal Deficits] : no focal deficits [Speech Grossly Normal] : speech grossly normal [Alert and Oriented x3] : oriented to person, place, and time

## 2025-07-26 NOTE — ASSESSMENT
[FreeTextEntry1] : Alert, oriented, well, pleasant.  systemic lupus erythematosus on plaquenil and cellcept bloodwork reviewed. low normal ferritin.  mother with thin hair density from 40's. Could see her scalp.  Scalp without erythema or scale. No bald patches. Even density throughout but very thin density. Hair not brittle or broken. Hair pull negative.   Female pattern alopecia. With episodes of telogen effluvium, increases when ill. Options discussed. Minoxidil twice per day for 6 months then evaluate efficacy.

## 2025-07-26 NOTE — HISTORY OF PRESENT ILLNESS
[FreeTextEntry1] : Hair loss for years but recent episode began a couple months ago. Treating with clove and rosemary as well as coconut oil [de-identified] : No personal or family history of cutaneous malignancy JERSEY Calhoun